# Patient Record
Sex: FEMALE | Race: WHITE
[De-identification: names, ages, dates, MRNs, and addresses within clinical notes are randomized per-mention and may not be internally consistent; named-entity substitution may affect disease eponyms.]

---

## 2020-10-31 ENCOUNTER — HOSPITAL ENCOUNTER (EMERGENCY)
Dept: HOSPITAL 11 - JP.ED | Age: 40
Discharge: HOME | End: 2020-10-31
Payer: COMMERCIAL

## 2020-10-31 DIAGNOSIS — Z20.828: ICD-10-CM

## 2020-10-31 DIAGNOSIS — K52.9: Primary | ICD-10-CM

## 2020-10-31 PROCEDURE — 85025 COMPLETE CBC W/AUTO DIFF WBC: CPT

## 2020-10-31 PROCEDURE — 80076 HEPATIC FUNCTION PANEL: CPT

## 2020-10-31 PROCEDURE — 99284 EMERGENCY DEPT VISIT MOD MDM: CPT

## 2020-10-31 PROCEDURE — 86140 C-REACTIVE PROTEIN: CPT

## 2020-10-31 PROCEDURE — 36415 COLL VENOUS BLD VENIPUNCTURE: CPT

## 2020-10-31 PROCEDURE — 80048 BASIC METABOLIC PNL TOTAL CA: CPT

## 2020-10-31 PROCEDURE — 81001 URINALYSIS AUTO W/SCOPE: CPT

## 2020-10-31 PROCEDURE — 96374 THER/PROPH/DIAG INJ IV PUSH: CPT

## 2020-10-31 NOTE — EDM.PDOC
ED HPI GENERAL MEDICAL PROBLEM





- General


Chief Complaint: Gastrointestinal Problem


Stated Complaint: COVID SYMPTOMS


Time Seen by Provider: 10/31/20 18:10


Source of Information: Reports: Patient





- History of Present Illness


INITIAL COMMENTS - FREE TEXT/NARRATIVE: 





39-year-old female who has no significant health problems presents to the 

emergency department with GI symptoms including nausea vomiting and diarrhea.  

She developed also taste and smell on Wednesday.  She developed nausea on 

Thursday.  Over the past 24 hours she has been vomiting.  She vomited 

approximately 3 times today.  She had 2 loose stools last night and one today.  

She denies any blood in it.  She reports no fever or chills.  She has no 

headache or shortness of breath.  She reports considerable amount of fatigue.  

She is lightheaded when she stands up.  She urinated twice today the last time 

approximately an hour and a half ago.  She is immunocompetent.  She no 

medication.  Her last menses was recently and she denies pregnancy.  She was 

exposed to a Covid positive coworker last weekend.  Her daughters were exposed 

to Covid positive fellow student at school during the past 10 days.  The family 

is quarantining at home.  Patient's  is not ill.





- Related Data


                                    Allergies











Allergy/AdvReac Type Severity Reaction Status Date / Time


 


No Known Allergies Allergy   Verified 10/31/20 18:15











Home Meds: 


                                    Home Meds





NK [No Known Home Meds]  10/31/20 [History]











Past Medical History


HEENT History: Reports: Impaired Vision


Genitourinary History: Reports: Other (See Below)


Other Genitourinary History: recent dx kidney stone 6mm


OB/GYN History: Reports: Pregnancy





- Infectious Disease History


Infectious Disease History: Reports: Chicken Pox





- Past Surgical History


Female  Surgical History: Reports:  Section, Tubal Ligation





Social & Family History





- Family History


Family Medical History: Unobtainable





- Tobacco Use


Tobacco Use Status *Q: Never Tobacco User





- Caffeine Use


Caffeine Use: Reports: None





ED ROS GENERAL





- Review of Systems


Review Of Systems: See Below


Constitutional: Reports: Malaise, Fatigue.  Denies: Fever, Chills, Night Sweats,

 Diaphoresis


HEENT: Denies: Sinus Problem, Throat Pain


Respiratory: Denies: Shortness of Breath, Cough


Cardiovascular: Reports: No Symptoms


GI/Abdominal: Reports: Diarrhea, Nausea, Vomiting.  Denies: Abdominal Pain


Musculoskeletal: Denies: Muscle Pain


Skin: Reports: No Symptoms


Neurological: Reports: No Symptoms





ED EXAM, GI/ABD





- Physical Exam


Exam: See Below


Exam Limited By: No Limitations


General Appearance: Alert, WD/WN, Mild Distress


Eyes: Bilateral: Normal Appearance


Throat/Mouth: Normal Inspection, Normal Lips, Normal Oropharynx


Neck: Normal Inspection, Supple, Non-Tender.  No: Lymphadenopathy (R), 

Lymphadenopathy (L)


Respiratory/Chest: No Respiratory Distress, Lungs Clear, Normal Breath Sounds


Cardiovascular: Regular Rate, Rhythm, No Edema, No JVD, No Murmur


GI/Abdominal Exam: Normal Bowel Sounds, Soft, Non-Tender, No Organomegaly, No 

Distention, No Mass


Extremities: Normal Inspection, Non-Tender, No Pedal Edema


Neurological: Alert, Oriented, Normal Cognition, No Motor/Sensory Deficits





Course





- Vital Signs


Text/Narrative:: 





This patient was evaluated in the ED.  She was hemodynamically stable.  An IV 

was started and she was given IV fluids.  Labs were drawn.  Laboratory work 

reveals a normal hematocrit.  White count is 14,000.  Her CRP is normal.  Basic 

metabolic profile shows normal creatinine as well as electrolytes.  Liver 

function tests are normal.  Urinalysis shows positive ketones.  Her blood sugar 

is normal.  The patient had a COVID-19 test 2 days ago and was waiting the 

results.  She is isolating at home.  She was given Zofran in addition to IV 

fluids.  She was feeling better at the end of her ED stay.  She is being 

discharged home with a prescription of Zofran 4 mg 3-4 times a day as needed for

 nausea.  To drink plenty of fluids.  She will keep her diet light.  She will 

follow up with her primary care provider or return to the ER if she has 

increased problems or concerns.  She is hemodynamically stable and agrees with 

this plan.


Last Recorded V/S: 


                                Last Vital Signs











Temp  36.1 C   10/31/20 18:23


 


Pulse  73   10/31/20 18:23


 


Resp  16   10/31/20 18:23


 


BP  117/77   10/31/20 18:23


 


Pulse Ox  100   10/31/20 18:23














- Orders/Labs/Meds


Orders: 


                               Active Orders 24 hr











 Category Date Time Status


 


 Sodium Chloride 0.9% [Normal Saline] 1,000 ml Med  10/31/20 19:00 Active





 IV ASDIRECTED   








                                Medication Orders





Sodium Chloride (Normal Saline)  1,000 mls @ 1,000 mls/hr IV ASDIRECTED LEAH


   Last Admin: 10/31/20 19:31  Dose: 1,000 mls/hr


   Documented by: JUANITA








Labs: 


                                Laboratory Tests











  10/31/20 10/31/20 10/31/20 Range/Units





  19:01 19:05 19:05 


 


WBC   14.2 H   (4.5-11.0)  K/uL


 


RBC   5.29   (3.30-5.50)  M/uL


 


Hgb   14.0  D   (12.0-15.0)  g/dL


 


Hct   43.3   (36.0-48.0)  %


 


MCV   82   (80-98)  fL


 


MCH   27   (27-31)  pg


 


MCHC   32   (32-36)  %


 


Plt Count   334   (150-400)  K/uL


 


Neut % (Auto)   88 H   (36-66)  %


 


Lymph % (Auto)   9 L   (24-44)  %


 


Mono % (Auto)   3   (2-6)  %


 


Eos % (Auto)   0 L   (2-4)  %


 


Baso % (Auto)   0   (0-1)  %


 


Sodium    135 L  (140-148)  mmol/L


 


Potassium    4.5  (3.6-5.2)  mmol/L


 


Chloride    99 L  (100-108)  mmol/L


 


Carbon Dioxide    28  (21-32)  mmol/L


 


Anion Gap    12.5  (5.0-14.0)  mmol/L


 


BUN    15  (7-18)  mg/dL


 


Creatinine    0.8  (0.6-1.0)  mg/dL


 


Est Cr Clr Drug Dosing    88.38  mL/min


 


Estimated GFR (MDRD)    > 60  (>60)  


 


Glucose    135 H  ()  mg/dL


 


Calcium    9.7  (8.5-10.1)  mg/dL


 


Total Bilirubin     (0.2-1.0)  mg/dL


 


Direct Bilirubin     (0.0-0.2)  mg/dL


 


Indirect Bilirubin     


 


AST     (15-37)  U/L


 


ALT     (12-78)  U/L


 


Alkaline Phosphatase     ()  U/L


 


C-Reactive Protein     (0.0-0.3)  mg/dL


 


Total Protein     (6.4-8.2)  g/dL


 


Albumin     (3.4-5.0)  g/dL


 


Globulin     (2.3-3.5)  g/dL


 


Albumin/Globulin Ratio     (1.2-2.2)  


 


Urine Color  Yellow    (YELLOW)  


 


Urine Appearance  Clear    (CLEAR)  


 


Urine pH  7.0    (5.0-8.0)  


 


Ur Specific Gravity  1.025    (1.008-1.030)  


 


Urine Protein  Trace H    (NEGATIVE)  mg/dL


 


Urine Glucose (UA)  Negative    (NEGATIVE)  mg/dL


 


Urine Ketones  >=160 H    (NEGATIVE)  mg/dL


 


Urine Occult Blood  Trace-intact H    (NEGATIVE)  


 


Urine Nitrite  Negative    (NEGATIVE)  


 


Urine Bilirubin  Negative    (NEGATIVE)  


 


Urine Urobilinogen  0.2    (0.2-1.0)  EU/dL


 


Ur Leukocyte Esterase  Negative    (NEGATIVE)  


 


Urine RBC  Not seen    (0-5)  


 


Urine WBC  Not seen    (0-5)  


 


Ur Epithelial Cells  Not seen    


 


Urine Bacteria  Few    














  10/31/20 10/31/20 Range/Units





  19:05 19:05 


 


WBC    (4.5-11.0)  K/uL


 


RBC    (3.30-5.50)  M/uL


 


Hgb    (12.0-15.0)  g/dL


 


Hct    (36.0-48.0)  %


 


MCV    (80-98)  fL


 


MCH    (27-31)  pg


 


MCHC    (32-36)  %


 


Plt Count    (150-400)  K/uL


 


Neut % (Auto)    (36-66)  %


 


Lymph % (Auto)    (24-44)  %


 


Mono % (Auto)    (2-6)  %


 


Eos % (Auto)    (2-4)  %


 


Baso % (Auto)    (0-1)  %


 


Sodium    (140-148)  mmol/L


 


Potassium    (3.6-5.2)  mmol/L


 


Chloride    (100-108)  mmol/L


 


Carbon Dioxide    (21-32)  mmol/L


 


Anion Gap    (5.0-14.0)  mmol/L


 


BUN    (7-18)  mg/dL


 


Creatinine    (0.6-1.0)  mg/dL


 


Est Cr Clr Drug Dosing    mL/min


 


Estimated GFR (MDRD)    (>60)  


 


Glucose    ()  mg/dL


 


Calcium    (8.5-10.1)  mg/dL


 


Total Bilirubin  0.6  D   (0.2-1.0)  mg/dL


 


Direct Bilirubin  0.19   (0.0-0.2)  mg/dL


 


Indirect Bilirubin  0.41   


 


AST  11 L   (15-37)  U/L


 


ALT  22   (12-78)  U/L


 


Alkaline Phosphatase  61   ()  U/L


 


C-Reactive Protein   < 0.05  (0.0-0.3)  mg/dL


 


Total Protein  8.0   (6.4-8.2)  g/dL


 


Albumin  4.0   (3.4-5.0)  g/dL


 


Globulin  4.0 H   (2.3-3.5)  g/dL


 


Albumin/Globulin Ratio  1.0 L   (1.2-2.2)  


 


Urine Color    (YELLOW)  


 


Urine Appearance    (CLEAR)  


 


Urine pH    (5.0-8.0)  


 


Ur Specific Gravity    (1.008-1.030)  


 


Urine Protein    (NEGATIVE)  mg/dL


 


Urine Glucose (UA)    (NEGATIVE)  mg/dL


 


Urine Ketones    (NEGATIVE)  mg/dL


 


Urine Occult Blood    (NEGATIVE)  


 


Urine Nitrite    (NEGATIVE)  


 


Urine Bilirubin    (NEGATIVE)  


 


Urine Urobilinogen    (0.2-1.0)  EU/dL


 


Ur Leukocyte Esterase    (NEGATIVE)  


 


Urine RBC    (0-5)  


 


Urine WBC    (0-5)  


 


Ur Epithelial Cells    


 


Urine Bacteria    











Meds: 


Medications











Generic Name Dose Route Start Last Admin





  Trade Name Freq  PRN Reason Stop Dose Admin


 


Sodium Chloride  1,000 mls @ 1,000 mls/hr  10/31/20 19:00  10/31/20 19:31





  Normal Saline  IV   1,000 mls/hr





  ASDIRECTED LEAH   Administration














Discontinued Medications














Generic Name Dose Route Start Last Admin





  Trade Name Freq  PRN Reason Stop Dose Admin


 


Ondansetron HCl  4 mg  10/31/20 18:50  10/31/20 19:31





  Zofran  IVPUSH  10/31/20 18:51  4 mg





  ONETIME ONE   Administration














Departure





- Departure


Time of Disposition: 20:49


Disposition: Home, Self-Care 01


Condition: Good


Clinical Impression: 


 Gastroenteritis, Exposure to COVID-19 virus








- Discharge Information


*PRESCRIPTION DRUG MONITORING PROGRAM REVIEWED*: No


*COPY OF PRESCRIPTION DRUG MONITORING REPORT IN PATIENT ADRIENNE: No


Referrals: 


PCP,None [Primary Care Provider] - 


Forms:  ED Department Discharge





Sepsis Event Note (ED)





- Evaluation


Sepsis Screening Result: No Definite Risk





- Focused Exam


Vital Signs: 


                                   Vital Signs











  Temp Pulse Resp BP Pulse Ox


 


 10/31/20 18:23  36.1 C  73  16  117/77  100


 


 10/31/20 18:02  36.1 C  73  16  117/77  100














- My Orders


Last 24 Hours: 


My Active Orders





10/31/20 19:00


Sodium Chloride 0.9% [Normal Saline] 1,000 ml IV ASDIRECTED 














- Assessment/Plan


Last 24 Hours: 


My Active Orders





10/31/20 19:00


Sodium Chloride 0.9% [Normal Saline] 1,000 ml IV ASDIRECTED